# Patient Record
Sex: FEMALE | Race: WHITE | URBAN - METROPOLITAN AREA
[De-identification: names, ages, dates, MRNs, and addresses within clinical notes are randomized per-mention and may not be internally consistent; named-entity substitution may affect disease eponyms.]

---

## 2019-04-11 ENCOUNTER — HISTORICAL (OUTPATIENT)
Dept: ADMINISTRATIVE | Facility: HOSPITAL | Age: 49
End: 2019-04-11

## 2020-12-29 ENCOUNTER — HOSPITAL ENCOUNTER (OUTPATIENT)
Dept: MEDSURG UNIT | Facility: HOSPITAL | Age: 50
End: 2020-12-31
Attending: SURGERY | Admitting: SURGERY

## 2020-12-29 LAB
ABS NEUT (OLG): 18.31 X10(3)/MCL (ref 2.1–9.2)
ALBUMIN SERPL-MCNC: 4.6 GM/DL (ref 3.5–5)
ALBUMIN/GLOB SERPL: 1.5 RATIO (ref 1.1–2)
ALP SERPL-CCNC: 90 UNIT/L (ref 40–150)
ALT SERPL-CCNC: 19 UNIT/L (ref 0–55)
APPEARANCE, UA: CLEAR
AST SERPL-CCNC: 13 UNIT/L (ref 5–34)
BACTERIA SPEC CULT: NORMAL /HPF
BASOPHILS # BLD AUTO: 0.1 X10(3)/MCL (ref 0–0.2)
BASOPHILS NFR BLD AUTO: 0 %
BILIRUB SERPL-MCNC: 0.8 MG/DL
BILIRUB UR QL STRIP: NEGATIVE
BILIRUBIN DIRECT+TOT PNL SERPL-MCNC: 0.3 MG/DL (ref 0–0.5)
BILIRUBIN DIRECT+TOT PNL SERPL-MCNC: 0.5 MG/DL (ref 0–0.8)
BUN SERPL-MCNC: 7.1 MG/DL (ref 7–18.7)
CALCIUM SERPL-MCNC: 9.7 MG/DL (ref 8.4–10.2)
CHLORIDE SERPL-SCNC: 101 MMOL/L (ref 98–107)
CO2 SERPL-SCNC: 26 MMOL/L (ref 22–29)
COLOR UR: YELLOW
CREAT SERPL-MCNC: 0.73 MG/DL (ref 0.55–1.02)
EOSINOPHIL # BLD AUTO: 0 X10(3)/MCL (ref 0–0.9)
EOSINOPHIL NFR BLD AUTO: 0 %
ERYTHROCYTE [DISTWIDTH] IN BLOOD BY AUTOMATED COUNT: 12.8 % (ref 11.5–17)
GLOBULIN SER-MCNC: 3 GM/DL (ref 2.4–3.5)
GLUCOSE (UA): NEGATIVE
GLUCOSE SERPL-MCNC: 108 MG/DL (ref 74–100)
HCT VFR BLD AUTO: 45.6 % (ref 37–47)
HGB BLD-MCNC: 15 GM/DL (ref 12–16)
HGB UR QL STRIP: NEGATIVE
KETONES UR QL STRIP: NEGATIVE
LEUKOCYTE ESTERASE UR QL STRIP: NEGATIVE
LIPASE SERPL-CCNC: 19 U/L
LYMPHOCYTES # BLD AUTO: 1.6 X10(3)/MCL (ref 0.6–4.6)
LYMPHOCYTES NFR BLD AUTO: 8 %
MCH RBC QN AUTO: 28.4 PG (ref 27–31)
MCHC RBC AUTO-ENTMCNC: 32.9 GM/DL (ref 33–36)
MCV RBC AUTO: 86.2 FL (ref 80–94)
MONOCYTES # BLD AUTO: 1.2 X10(3)/MCL (ref 0.1–1.3)
MONOCYTES NFR BLD AUTO: 6 %
NEUTROPHILS # BLD AUTO: 18.31 X10(3)/MCL (ref 2.1–9.2)
NEUTROPHILS NFR BLD AUTO: 86 %
NITRITE UR QL STRIP: NEGATIVE
PH UR STRIP: 6.5 [PH] (ref 5–9)
PLATELET # BLD AUTO: 391 X10(3)/MCL (ref 130–400)
PMV BLD AUTO: 9.4 FL (ref 9.4–12.4)
POTASSIUM SERPL-SCNC: 4.5 MMOL/L (ref 3.5–5.1)
PROT SERPL-MCNC: 7.6 GM/DL (ref 6.4–8.3)
PROT UR QL STRIP: NEGATIVE
RBC # BLD AUTO: 5.29 X10(6)/MCL (ref 4.2–5.4)
RBC #/AREA URNS HPF: NORMAL /[HPF]
SODIUM SERPL-SCNC: 138 MMOL/L (ref 136–145)
SP GR UR STRIP: 1.01 (ref 1–1.03)
SQUAMOUS EPITHELIAL, UA: NORMAL
UROBILINOGEN UR STRIP-ACNC: 0.2
WBC # SPEC AUTO: 21.4 X10(3)/MCL (ref 4.5–11.5)
WBC #/AREA URNS HPF: NORMAL /[HPF]

## 2020-12-30 LAB
ABS NEUT (OLG): 11.88 X10(3)/MCL (ref 2.1–9.2)
ALBUMIN SERPL-MCNC: 3.4 GM/DL (ref 3.5–5)
ALBUMIN/GLOB SERPL: 1.3 RATIO (ref 1.1–2)
ALP SERPL-CCNC: 68 UNIT/L (ref 40–150)
ALT SERPL-CCNC: 13 UNIT/L (ref 0–55)
AST SERPL-CCNC: 10 UNIT/L (ref 5–34)
BASOPHILS # BLD AUTO: 0.1 X10(3)/MCL (ref 0–0.2)
BASOPHILS NFR BLD AUTO: 0 %
BILIRUB SERPL-MCNC: 0.9 MG/DL
BILIRUBIN DIRECT+TOT PNL SERPL-MCNC: 0.3 MG/DL (ref 0–0.5)
BILIRUBIN DIRECT+TOT PNL SERPL-MCNC: 0.6 MG/DL (ref 0–0.8)
BUN SERPL-MCNC: 8.8 MG/DL (ref 7–18.7)
CALCIUM SERPL-MCNC: 8 MG/DL (ref 8.4–10.2)
CHLORIDE SERPL-SCNC: 106 MMOL/L (ref 98–107)
CO2 SERPL-SCNC: 25 MMOL/L (ref 22–29)
CREAT SERPL-MCNC: 0.79 MG/DL (ref 0.55–1.02)
EOSINOPHIL # BLD AUTO: 0.1 X10(3)/MCL (ref 0–0.9)
EOSINOPHIL NFR BLD AUTO: 0 %
ERYTHROCYTE [DISTWIDTH] IN BLOOD BY AUTOMATED COUNT: 12.9 % (ref 11.5–17)
GLOBULIN SER-MCNC: 2.7 GM/DL (ref 2.4–3.5)
GLUCOSE SERPL-MCNC: 105 MG/DL (ref 74–100)
HCT VFR BLD AUTO: 38.6 % (ref 37–47)
HGB BLD-MCNC: 12.3 GM/DL (ref 12–16)
LYMPHOCYTES # BLD AUTO: 2.6 X10(3)/MCL (ref 0.6–4.6)
LYMPHOCYTES NFR BLD AUTO: 17 %
MCH RBC QN AUTO: 28.1 PG (ref 27–31)
MCHC RBC AUTO-ENTMCNC: 31.9 GM/DL (ref 33–36)
MCV RBC AUTO: 88.1 FL (ref 80–94)
MONOCYTES # BLD AUTO: 1 X10(3)/MCL (ref 0.1–1.3)
MONOCYTES NFR BLD AUTO: 7 %
NEUTROPHILS # BLD AUTO: 11.88 X10(3)/MCL (ref 2.1–9.2)
NEUTROPHILS NFR BLD AUTO: 75 %
PLATELET # BLD AUTO: 303 X10(3)/MCL (ref 130–400)
PMV BLD AUTO: 9.4 FL (ref 9.4–12.4)
POTASSIUM SERPL-SCNC: 3.8 MMOL/L (ref 3.5–5.1)
PROT SERPL-MCNC: 6.1 GM/DL (ref 6.4–8.3)
RBC # BLD AUTO: 4.38 X10(6)/MCL (ref 4.2–5.4)
SODIUM SERPL-SCNC: 138 MMOL/L (ref 136–145)
WBC # SPEC AUTO: 15.8 X10(3)/MCL (ref 4.5–11.5)

## 2020-12-31 LAB
ABS NEUT (OLG): 13.58 X10(3)/MCL (ref 2.1–9.2)
BASOPHILS # BLD AUTO: 0.1 X10(3)/MCL (ref 0–0.2)
BASOPHILS NFR BLD AUTO: 0 %
BUN SERPL-MCNC: 7.7 MG/DL (ref 7–18.7)
CALCIUM SERPL-MCNC: 7.9 MG/DL (ref 8.4–10.2)
CHLORIDE SERPL-SCNC: 108 MMOL/L (ref 98–107)
CO2 SERPL-SCNC: 23 MMOL/L (ref 22–29)
CREAT SERPL-MCNC: 0.69 MG/DL (ref 0.55–1.02)
CREAT/UREA NIT SERPL: 11
EOSINOPHIL # BLD AUTO: 0 X10(3)/MCL (ref 0–0.9)
EOSINOPHIL NFR BLD AUTO: 0 %
ERYTHROCYTE [DISTWIDTH] IN BLOOD BY AUTOMATED COUNT: 12.9 % (ref 11.5–17)
GLUCOSE SERPL-MCNC: 105 MG/DL (ref 74–100)
HCT VFR BLD AUTO: 35.3 % (ref 37–47)
HGB BLD-MCNC: 11 GM/DL (ref 12–16)
LYMPHOCYTES # BLD AUTO: 1.9 X10(3)/MCL (ref 0.6–4.6)
LYMPHOCYTES NFR BLD AUTO: 12 %
MCH RBC QN AUTO: 27.8 PG (ref 27–31)
MCHC RBC AUTO-ENTMCNC: 31.2 GM/DL (ref 33–36)
MCV RBC AUTO: 89.4 FL (ref 80–94)
MONOCYTES # BLD AUTO: 1 X10(3)/MCL (ref 0.1–1.3)
MONOCYTES NFR BLD AUTO: 6 %
NEUTROPHILS # BLD AUTO: 13.58 X10(3)/MCL (ref 2.1–9.2)
NEUTROPHILS NFR BLD AUTO: 82 %
PLATELET # BLD AUTO: 283 X10(3)/MCL (ref 130–400)
PMV BLD AUTO: 9.4 FL (ref 9.4–12.4)
POTASSIUM SERPL-SCNC: 4.2 MMOL/L (ref 3.5–5.1)
RBC # BLD AUTO: 3.95 X10(6)/MCL (ref 4.2–5.4)
SODIUM SERPL-SCNC: 140 MMOL/L (ref 136–145)
WBC # SPEC AUTO: 16.6 X10(3)/MCL (ref 4.5–11.5)

## 2021-01-03 LAB
FINAL CULTURE: NORMAL
FINAL CULTURE: NORMAL

## 2022-04-30 NOTE — H&P
Patient:   Marilia Hanley            MRN: [id number]            FIN: [id number]               Age:   50 years     Sex:  Female     :  1970   Associated Diagnoses:   None   Author:   Rin Boone MD      Results Review   General results   Today's results : ALLRESLTSECT   2020 3:58 CST      Sodium Lvl                138 mmol/L                             Potassium Lvl             3.8 mmol/L                             Chloride                  106 mmol/L                             CO2                       25 mmol/L                             Calcium Lvl               8.0 mg/dL  LOW                             Glucose Lvl               105 mg/dL  HI                             BUN                       8.8 mg/dL                             Creatinine                0.79 mg/dL                             eGFR-AA                   >60  NA                             eGFR-MOHINDER                  >60 mL/min/1.73 m2  NA                             Bili Total                0.9 mg/dL                             Bili Direct               0.3 mg/dL                             Bili Indirect             0.60 mg/dL                             AST                       10 unit/L                             ALT                       13 unit/L                             Alk Phos                  68 unit/L                             Total Protein             6.1 gm/dL  LOW                             Albumin Lvl               3.4 gm/dL  LOW                             Globulin                  2.7 gm/dL                             A/G Ratio                 1.3 ratio                             WBC                       15.8 x10(3)/mcL  HI                             RBC                       4.38 x10(6)/mcL                             Hgb                       12.3 gm/dL                             Hct                       38.6 %                             Platelet                  303  x10(3)/mcL                             MCV                       88.1 fL                             MCH                       28.1 pg                             MCHC                      31.9 gm/dL  LOW                             RDW                       12.9 %                             MPV                       9.4 fL                             Abs Neut                  11.88 x10(3)/mcL  HI                             Neutro Auto               75 %  NA                             Lymph Auto                17 %  NA                             Mono Auto                 7 %  NA                             Eos Auto                  0 %  NA                             Abs Eos                   0.1 x10(3)/mcL                             Basophil Auto             0 %  NA                             Abs Neutro                11.88 x10(3)/mcL  HI                             Abs Lymph                 2.6 x10(3)/mcL                             Abs Mono                  1.0 x10(3)/mcL                             Abs Baso                  0.1 x10(3)/mcL    12/29/2020 13:33 CST     Sodium Lvl                138 mmol/L                             Potassium Lvl             4.5 mmol/L                             Chloride                  101 mmol/L                             CO2                       26 mmol/L                             Calcium Lvl               9.7 mg/dL                             Glucose Lvl               108 mg/dL  HI                             BUN                       7.1 mg/dL                             Creatinine                0.73 mg/dL                             eGFR-AA                   >60  NA                             eGFR-MOHINDER                  >60 mL/min/1.73 m2  NA                             Bili Total                0.8 mg/dL                             Bili Direct               0.3 mg/dL                             Bili Indirect             0.50 mg/dL                             AST                        13 unit/L                             ALT                       19 unit/L                             Alk Phos                  90 unit/L                             Total Protein             7.6 gm/dL                             Albumin Lvl               4.6 gm/dL                             Globulin                  3.0 gm/dL                             A/G Ratio                 1.5 ratio                             Lipase Lvl                19 U/L                             WBC                       21.4 x10(3)/mcL  HI                             RBC                       5.29 x10(6)/mcL                             Hgb                       15.0 gm/dL                             Hct                       45.6 %                             Platelet                  391 x10(3)/mcL                             MCV                       86.2 fL                             MCH                       28.4 pg                             MCHC                      32.9 gm/dL  LOW                             RDW                       12.8 %                             MPV                       9.4 fL                             Abs Neut                  18.31 x10(3)/mcL  HI                             Neutro Auto               86 %  NA                             Lymph Auto                8 %  NA                             Mono Auto                 6 %  NA                             Eos Auto                  0 %  NA                             Abs Eos                   0.0 x10(3)/mcL                             Basophil Auto             0 %  NA                             Abs Neutro                18.31 x10(3)/mcL  HI                             Abs Lymph                 1.6 x10(3)/mcL                             Abs Mono                  1.2 x10(3)/mcL                             Abs Baso                  0.1 x10(3)/mcL        History of Present Illness   50F with abdominal pain and cramping since  Monday night; h/o umbilical hernia s/p repair in April, 2019 (Dr. Sharif).  She says that she started to have cramping abdominal pain on Monday night.   She notes the pain was more periumbilical and central pelvis/lower abdomen.  She had nausea and vomiting once associated with this. She went to sleep and woke up Tuesday morning with progressively worsened pain and cramping.  + Flatus and BM.     She came to the ER for evaluation after CT a/p obtained per PCP suggested acute appendicitis.   This has never happened before.  No sick contacts.  No recent illnesses.     MedHx: denies  SurgHx: tonsillectomy as child, umbilical hernia repair 2019, colonoscopy x 2, BTL, cone biopsy  FamHx: colon cancer in father and his parents, htn  NKDA  Meds: as listed  SocHx: lives at home with  and 16 y/o son.  Has three children from prior relationship.  No alcohol, tobacco, illicit drugs.  Works as  at [employer] in [place].      Physical Examination   vs ok, Tmax 99.6 ~ 11 pm  a+ox3, nad  eomi  neck supple  no supraclavicular lymphadenopathy  chest rise equal B  heart rrr  abd soft, + ttp lower mid pelvis/abdomen, mild ttp rlq and mild ttp llq, nontender upper abdomen  ble no ttp  skin no apparent rashes  psych calm, cooperative  neuro no lateralizing defects appreciated grossly     CT a/p images and report personally reviewed -    IMPRESSION:   Findings consistent with acute appendicitis. No drainable fluid  collection or free air.      Impression and Plan   50F with acute appendicitis - plan for:  1) Admit  2) hydrate  3) iv abx  4) to OR for laparoscopic appendectomy - r/b/a all discussed, and she would like to proceed.      I appreciate the opportunity to be involved in Mrs. Hanley's care.

## 2022-04-30 NOTE — OP NOTE
DATE OF SURGERY:    04/12/2019    SURGEON:  Flip Sharif MD    PREPROCEDURE DIAGNOSIS:  Incarcerated umbilical hernia.    POSTPROCEDURE DIAGNOSIS:  Incarcerated umbilical hernia.    PROCEDURE PERFORMED:  Umbilical hernia repair with mesh.    ANESTHESIA:  General endotracheal.    DESCRIPTION OF PROCEDURE:  The patient was transferred to the operating room, placed on operating room table in supine position.  General anesthetic was administered per Anesthesia.  The patient tolerated this well.  Abdomen was clipped with sterile clippers and prepped with chlorhexidine solution and draped with sterile drapes and cloth.  Time-out was taken to verify correct patient and procedure.  Preoperative antibiotic was given.     After a time-out was taken, a skin marker was used to make a curvilinear transverse subumbilical incision mingo.  It was infiltrated with local anesthetic.  It was taken down through the skin and subcutaneous tissue with a 15 blade, taken down through the subcutaneous tissue using electrocautery until the anterior rectus sheath was encountered.  The hernia was completely encircled with blunt dissection.  The overlying umbilical dermis was  from the underlying umbilical hernia sac with sharp electrocautery dissection, taking care to protect the dermis.  Once it was completely removed from the overlying umbilical dermis, self-retaining retractors were placed.  The fascia was cleaned from its edges approximately a centimeter around circumferentially.  The hernia sac was completely removed with electrocautery.  The hernia was noted to contain preperitoneal fat only and all appeared healthy.  Hernia sac was sent as a specimen.  Preperitoneal fat was allowed to reduce back through the hernia defect.  The hernia defect appeared to be the size of my 5th fingertip.  The preperitoneal space was then further developed with blunt electrocautery dissection large enough to fit the 4.3 cm round Ventralex  ST mesh.  Once the preperitoneal space was developed, the 4.3 cm round Ventralex ST mesh was rolled into a cylinder, placed into the preperitoneal space, and allowed to unravel and lay completely flat throughout the preperitoneal space.  Once the mesh was ensured to be completely flat in all directions with good underlay, the fascia was closed in transverse fashion over the mesh, taking a small piece of mesh tails with each throw of 0 Prolene interrupted sutures.  Excess mesh tails were completely removed and the fascia was closed completely over the mesh without difficulty.  At this point, the wound was copiously irrigated with saline solution.  Local anesthetic was injected into the anterior fascia and subcutaneous space under direct vision.  Hemostasis was assured.  The umbilical stalk was reapproximated to the anterior fascia with an interrupted inverted 3-0 Vicryl suture.  Appeared to be adequate.  At that point, the deep dermal space was closed with interrupted inverted 3-0 Vicryl sutures.  The skin was closed with running 4-0 Monocryl subcuticular stitch and Dermabond was placed to the skin for dressing.  Overlying 4 x 4 and Tegaderm light pressure dressing was placed over the umbilicus.  The patient tolerated the procedure well.  Needle and sponge counts were correct.    COMPLICATIONS:  None.    SPECIMENS:  Hernia sac.    ESTIMATED BLOOD LOSS:  Less than 10 cc.        ______________________________  MD NATASHA Dan/CARLOS  DD:  04/12/2019  Time:  04:24PM  DT:  04/12/2019  Time:  04:45PM  Job #:  [id number]

## 2022-04-30 NOTE — ED PROVIDER NOTES
"   Patient:   Marilia Hanley            MRN: [id number]            FIN: [id number]               Age:   50 years     Sex:  Female     :  1970   Associated Diagnoses:   Abdominal pain; Acute appendicitis   Author:   Will MIDDLETON, Alex BREWSTER      Report Summary       General:       Alert, no acute distress.       Basic Information   Time seen: Date & time 2020 13:26:00.   History source: Patient, family.   Arrival mode: Private vehicle, walking.   History limitation: None.   Additional information: Patient's physician(s): Tabitha MIDDLETON, Lizzie Gibbs.      History of Present Illness   The patient presents with abdominal pain, Patient states lower abdominal pain, nausea, and vomiting starting last night (lorna, NP).  and I, Dr. Solis, assumed care of this patient upon walking into the room at 1642.    51 y/o CF with a history of umbilical hernia, presents to the ED complaining of lower abdominal pain since 2100 yesterday. The pt reports that the abdominal pain started gradually, and became severe at 2300. She describes the pain as "stabbing" and "burning." Nausea and vomiting x1 are associated symptoms. Denies constipation, diarrhea, fever, dysuria, or hematuria..  The onset was 2020 21:00:00  and gradual.  The course/duration of symptoms is improving.  The character of symptoms is "stabbing" and "burning".  The degree at onset was moderate.  The Location of pain at onset was lower and abdominal.  The degree at present is moderate.  The Location of pain at present is lower and abdominal.  Radiating pain: none. The exacerbating factor is none.  The relieving factor is none.  Therapy today: none.  Risk factors consist of none.  Associated symptoms: nausea, vomiting, denies diarrhea and denies fever.        Review of Systems   Constitutional symptoms:  No fever, no chills, no sweats   Skin symptoms:  No rash, no petechiae.    Eye symptoms:  Vision unchangedNo pain, no discharge, no icterus, no " diplopia, no blurred vision, no blindness.    ENMT symptoms:  Negative except as documented in HPI, no ear pain, no sore throat, no nasal congestion, no sinus pain.    Respiratory symptoms:  Negative except as documented in HPI, no shortness of breath, no orthopnea, no cough, no hemoptysis, no stridor, no wheezing.    Cardiovascular symptoms:  Negative except as documented in HPI, no chest pain, no palpitations, no syncope, no diaphoresis, no peripheral edema.    Gastrointestinal symptoms:  Abdominal pain, right lower quadrant, left lower quadrant, periumbilical, suprapubic, sharp, burning, nausea, vomiting, no diarrhea, no constipation, no rectal bleeding, no rectal pain.    Genitourinary symptoms:  No dysuria, no hematuria.    Musculoskeletal symptoms:  No back pain, no Muscle pain   Neurologic symptoms:  No headache, no dizziness, no altered level of consciousness, no numbness, no tingling   Psychiatric symptoms:  Negative except as documented in HPI   Endocrine symptoms:  Negative except as documented in HPI.   Hematologic/Lymphatic symptoms:  Negative except as documented in HPI      Health Status   Allergies: No known allergies.   Medications:  (Selected)   Documented Medications  Documented  ergocalciferol 50,000 intl units (1.25 mg) oral capsule: 79019 IntUnit = 1 cap(s), Oral, qWeek  lisinopril 10 mg oral tablet: 10 mg = 1 tab(s), Oral, Daily  sertraline 25 mg oral tablet: 25 mg = 1 tab(s), Oral, Daily.      Past Medical/ Family/ Social History   Medical history: Negative.   Surgical history:    Hernia Repair Umbilical (.) on 4/12/2019 at 48 Years.  Comments:  4/12/2019 8:32 LYNSEY Sanon RN, Fco ENGLISH  auto-populated from documented surgical case  tonsillectomy.  laproscopy.  bilateral tubal ligation.  cervical cone biopsy.  breast augmentation.  colonscopy..   Family history: Not significant.   Social history: Alcohol use: Regularly, Tobacco use: Regularly.      Physical Examination               Vital  Signs   Vital Signs   12/29/2020 16:11 CST     Peripheral Pulse Rate     84 bpm                             Respiratory Rate          18 br/min                             SpO2                      96 %                             Oxygen Therapy            Room air                             Systolic Blood Pressure   114 mmHg                             Diastolic Blood Pressure  72 mmHg                             Mean Arterial Pressure, Cuff              86 mmHg    12/29/2020 16:09 CST     Oxygen Therapy            Room air    12/29/2020 13:26 CST     Temperature Oral          36.7 DegC                             Temperature Oral (calculated)             98.06 DegF                             Peripheral Pulse Rate     78 bpm                             Respiratory Rate          20 br/min                             SpO2                      97 %                             Oxygen Therapy            Room air                             Systolic Blood Pressure   159 mmHg  HI                             Diastolic Blood Pressure  92 mmHg  HI  .   Measurements   12/29/2020 13:26 CST     Weight Dosing             72.5 kg                             Weight Measured and Calculated in Lbs     159.83 lb                             Weight Estimated          72.5 kg                             Height/Length Dosing      167 cm                             Height/Length Estimated   167 cm                             Body Mass Index Estimated 26 kg/m2  .   Basic Oxygen Information   12/29/2020 16:11 CST     SpO2                      96 %                             Oxygen Therapy            Room air    12/29/2020 16:09 CST     Oxygen Therapy            Room air    12/29/2020 13:26 CST     SpO2                      97 %                             Oxygen Therapy            Room air  .   General:  Alert, no acute distress   Neurological:  Alert and oriented to person, place, time, and situation, No focal neurological deficit  observed, CN II-XII intact, normal sensory observed, normal motor observed, normal speech observed   Skin:  Warm, dry, no rash   Head:  Normocephalic, atraumatic   Neck:  Supple, trachea midline, no JVD   Eye:  Pupils are equal, round and reactive to light, extraocular movements are intact, normal conjunctiva, vision unchanged   Ears, nose, mouth and throat:  Tympanic membranes clear, oral mucosa moist, no pharyngeal erythema or exudate.    Cardiovascular:  Regular rate and rhythm, No murmur, No edema   Respiratory:  Lungs are clear to auscultation, respirations are non-labored, breath sounds are equal, Symmetrical chest wall expansion.    Chest wall:  No tenderness   Back:  Normal range of motion   Musculoskeletal:  Normal ROM, normal strength, no tenderness   Gastrointestinal:  Soft, Non distended, Normal bowel sounds, No organomegaly, Tenderness: Periumbilical, bilateral lower quadrants, Guarding: Negative, Rebound: Negative, Signs: Rovsing's.    Psychiatric:  Cooperative, appropriate mood & affect, normal judgment            Medical Decision Making   Differential Diagnosis:  Abdominal pain, Appendicitis, bowel obstruction, incarcerated hernia.    Rationale:  Patient with a prior history of periumbilical hernia status post surgery here for periumbilical pain is about 9 PM last night and improved overnight went to sleep woke up this morning worse with a 2 episodes of vomiting.  Denies any fevers.  Vital signs stable here, afebrile.  Some with lower quadrant and suprapubic abdominal TTP with no rebound or guarding. NL BS.  Labs with significant like leukocytosis at 21,000, otherwise unremarkable.  Given Toradol, IV fluids and Zofran with significant provement of pain.  CT abdomen pelvis with contrast shows acute appendicitis.  Surgery called at 1705 and will evaluate patient in the ER for acute surgical therapy. Given dose if Zosyn in ED..   Documents reviewed:  Emergency department nurses' notes.   Orders  Launch  Order Profile (Selected)   Inpatient Orders  Ordered  Sodium Chloride 0.9% 1000mL 1,000 mL: 1,000 mL, 1,000 mL, IV, 999 mL/hr, order duration: 1 dose(s), start date 12/29/20 16:51:00 CST, stop date 12/29/20 17:50:00 CST, 1.81, m2  Ordered (Collected)  Blood Culture: 12/29/20 16:57:59 CST, STAT collect, BLOOD, Collected, Stop date 12/29/20 16:57:00 CST  Blood Culture: 12/29/20 16:57:59 CST, STAT collect, BLOOD, Collected, Stop date 12/29/20 16:57:00 CST  Completed  Automated Diff: NOW collect, 12/29/20 13:33:00 CST, Blood, Collected, Stop date 12/29/20 13:33:00 CST, Lab Collect, Print Label By Order Location, 12/29/20 13:27:00 CST  CBC w/ Auto Diff: NOW collect, 12/29/20 13:33:57 CST, BLOOD, Collected, Stop date 12/29/20 13:33:00 CST, Lab Collect  CMP: STAT collect, 12/29/20 13:33:57 CST, BLOOD, Collected, Stop date 12/29/20 13:33:00 CST, Lab Collect  CT Abdomen and Pelvis W Contrast: Stat, 12/29/20 15:31:00 CST, Abdominal Pain, None, Stretcher, Creatinine if needed per protocol, Rad Type, 12/29/20 15:31:00 CST  Estimated Glomerular Filtration Rate: STAT collect, 12/29/20 13:33:00 CST, Blood, Collected, Stop date 12/29/20 13:33:00 CST, Lab Collect, Print Label By Order Location, 12/29/20 13:27:00 CST  Lipase Level: STAT collect, 12/29/20 13:33:57 CST, BLOOD, Collected, Stop date 12/29/20 13:33:00 CST, Lab Collect  POC Urine Pregnancy Test ED: Urine, Stat collect, Collected, 12/29/20 13:27:00 CST by Frandy KINGSTON, Celia GUILLERMO, Stop date 12/29/20 13:27:00 CST, Nurse collect, Print Label By Order Location  Sodium Chloride 0.9% intravenous solution: 100 mL, Soln, N/A, Once, Stop date 12/29/20 16:46:14 CST, Physician Stop, 12/29/20 16:46:14 CST  Toradol: 30 mg, form: Injection, IV Push, Once, first dose 12/29/20 13:27:00 CST, stop date 12/29/20 13:27:00 CST, STAT  UA with Reflex: Stat collect, Urine, 12/29/20 13:27:00 CST, Stop date 12/29/20 13:28:00 CST, Nurse collect, Print Label By Order Location  Zofran 2 mg/mL injectable  solution: 4 mg, form: Injection, IV Push, Once, first dose 12/29/20 13:27:00 CST, stop date 12/29/20 13:27:00 CST, STAT  Zosyn 3.375 gm (for IVPB): 3.375 gm, form: Injection, IV Piggyback, Once, Infuse over: 0.5 hr, first dose 12/29/20 16:41:00 CST, stop date 12/29/20 16:41:00 CST, STAT  iopamidol: form: Soln, IV, AdHoc, first dose 12/29/20 16:21:38 CST, stop date 12/29/20 16:21:38 CST  piperacillin-tazobactam: 3.375 gm, 1 EA, Injection, N/A, Once, Stop date 12/29/20 16:46:08 CST, Physician Stop, 12/29/20 16:46:08 CST.   Results review:  Lab results : Lab View   12/29/2020 14:48 CST     Est Creat Clearance Ser   87.37 mL/min    12/29/2020 13:46 CST     U beta hCG Ql POC         Negative    12/29/2020 13:33 CST     Sodium Lvl                138 mmol/L                             Potassium Lvl             4.5 mmol/L                             Chloride                  101 mmol/L                             CO2                       26 mmol/L                             Calcium Lvl               9.7 mg/dL                             Glucose Lvl               108 mg/dL  HI                             BUN                       7.1 mg/dL                             Creatinine                0.73 mg/dL                             eGFR-AA                   >60  NA                             eGFR-MOHINDER                  >60 mL/min/1.73 m2  NA                             Bili Total                0.8 mg/dL                             Bili Direct               0.3 mg/dL                             Bili Indirect             0.50 mg/dL                             AST                       13 unit/L                             ALT                       19 unit/L                             Alk Phos                  90 unit/L                             Total Protein             7.6 gm/dL                             Albumin Lvl               4.6 gm/dL                             Globulin                  3.0 gm/dL                              A/G Ratio                 1.5 ratio                             Lipase Lvl                19 U/L                             WBC                       21.4 x10(3)/mcL  HI                             RBC                       5.29 x10(6)/mcL                             Hgb                       15.0 gm/dL                             Hct                       45.6 %                             Platelet                  391 x10(3)/mcL                             MCV                       86.2 fL                             MCH                       28.4 pg                             MCHC                      32.9 gm/dL  LOW                             RDW                       12.8 %                             MPV                       9.4 fL                             Abs Neut                  18.31 x10(3)/mcL  HI                             Neutro Auto               86 %  NA                             Lymph Auto                8 %  NA                             Mono Auto                 6 %  NA                             Eos Auto                  0 %  NA                             Abs Eos                   0.0 x10(3)/mcL                             Basophil Auto             0 %  NA                             Abs Neutro                18.31 x10(3)/mcL  HI                             Abs Lymph                 1.6 x10(3)/mcL                             Abs Mono                  1.2 x10(3)/mcL                             Abs Baso                  0.1 x10(3)/mcL    12/29/2020 13:08 CST     UA Appear                 CLEAR                             UA Color                  YELLOW                             UA Spec Grav              1.009                             UA Bili                   Negative                             UA pH                     6.5                             UA Urobilinogen           0.2                             UA Blood                  Negative                              UA Glucose                Negative                             UA Ketones                Negative                             UA Protein                Negative                             UA Nitrite                Negative                             UA Leuk Est               Negative                             UA WBC                    NONE SEEN                             UA RBC                    NONE SEEN                             UA Bacteria               NONE SEEN /HPF                             UA Squam Epithelial       NONE SEEN  .   Radiology results:  Rad Results (ST)  < 12 hrs   Accession: [id number]  Order: CT Abdomen and Pelvis W Contrast  Report Dt/Tm: 12/29/2020 16:25  Report:   CT Abdomen and Pelvis W Contrast     REASON FOR STUDY: Abdominal Pain      TECHNIQUE: CT imaging was performed of the abdomen and pelvis after  the administration of intravenous contrast. Dose length product is 640  mGycm. Automatic exposure control, adjustment of mA/kV or iterative  reconstruction technique was used to limit radiation dose.     COMPARISON: None      FINDINGS:      Liver: Normal.      Gallbladder and biliary tree: No calcified gallstones. No intra or  extrahepatic biliary ductal dilation.      Pancreas: Normal.     Spleen: Normal.     Adrenals: Normal.     Kidneys and ureters: Normal.     Bladder: Decompressed.     Reproductive organs: No pelvic masses.     Stomach/bowel: The appendix is dilated measuring up to 11 mm in  thickness with surrounding inflammatory changes and a 4 mm  appendicolith at the appendix base. There is no bowel obstruction.     Lymph nodes: Small scattered mesenteric lymph nodes which may be  reactive.     Peritoneum: Mild stranding in the central mesentery may be  inflammatory. No organized fluid collection or free air.     Vessels: No abdominal aortic aneurysm.      Abdominal wall: Normal.     Lung bases: Minimal left basilar subsegmental atelectasis.     Bones: No acute osseous  findings.      IMPRESSION:   Findings consistent with acute appendicitis. No drainable fluid  collection or free air.    .      Impression and Plan   Diagnosis   Abdominal pain (PNED [id number])   Acute appendicitis ([id number]-ZE K35.80)      Calls-Consults   -  12/29/2020 17:05:00 , surgical hospitalist resident Dr Petit.    Plan   Condition: Improved, Stable.    Disposition: Admit.    Counseled: Patient, Family, Regarding diagnosis, Regarding diagnostic results, Regarding treatment plan, Patient indicated understanding of instructions, Family verbalizes understanding .    Notes: IHenrietta, acted solely as a scribe for and in the presence of Dr. Solis who performed the service., IAlex M.D., personally performed the history, physical exam and medical decision making; and confirmed the accuracy of the information in the transcribed note.

## 2022-04-30 NOTE — OP NOTE
DATE OF SURGERY:    12/30/2020    SURGEON:  Flip Sharif MD    PREPROCEDURE DIAGNOSIS:  Acute appendicitis.    POSTPROCEDURE DIAGNOSIS:  Acute appendicitis.    PROCEDURE PERFORMED:  Laparoscopic appendectomy/cecectomy.    ANESTHESIA:  General.    DESCRIPTION OF PROCEDURE:  Patient was transferred to the operating room, placed on the operating table in supine position.  General anesthetic was administered per anesthesia.  Patient tolerated this well.  Daugherty catheter was placed.  Abdomen was prepped with chlorhexidine solution, draped with sterile drapes and cloths.  Timeout was taken to verify correct patient and procedure.  Preoperative antibiotic was given.     After time out was taken, Gelpi clamp was placed in the umbilicus, the umbilicus was raised, and the supraumbilical space was infiltrated with 0.5% Marcaine with epinephrine.  A 5 mm transverse incision was made with an 11 blade.  Veress needle was inserted into the abdomen.  The abdomen was insufflated with 15 mmHg CO2, and patient tolerated this well.  The Veress needle was removed.  A 5 mm Visiport was placed under direct vision into the abdominal cavity.  No Veress needle injury was noted.  She was noted to have omental attachments to her previous umbilical hernia repair, but no signs of recurrence were noted, and at that point a 5 mm suprapubic port, 11 mm left lower quadrant port were placed under direct vision after infiltration of local anesthetic.  The patient was positioned with the head down, left side down, to expose the right lower quadrant.  The base of the cecum was traced from the tenia to the base of the appendix, which was noted to lay in the right pelvis going toward the suprapubic space.  The appendix was noted to be acutely injected and thick-walled.  The terminal ileum and Markus's veil were draped over the appendix.  These were bluntly dissected free until the appendix could be grasped in its body and brought into the right  lower quadrant.  The ileum was protected throughout the dissection.  A Harmonic Scalpel was used to take the mesoappendix from the tip of the appendix to the base of the appendix.  The base of the appendix was noted to be indurated and involved in the acute inflammatory process.  The cecum was noted to be soft.  The Harmonic Scalpel was used to take the investing tissue and mesentery from the cecum toward the ileocecal valve.  The ileocecal valve was noted to be widely patent, and once the mesentery was completely taken to the ileocecal valve, a green load Endo-VIKRAM stapler was placed across the cecum and fired.  The 2nd fire was taken with a blue load Endo-VIKRAM stapler to completely transect the cecum with the appendix.  The ileocecal valve was protected throughout the stapling process.  It appeared to be widely patent after this was completed.  The mesoappendix was noted to be hemostatic.  The staple lines also appeared to be hemostatic.  At that point, the appendix was placed into a 10 mm endoscopic retrieval bag and removed through the left lower quadrant port without difficulty or dilation.  The staple line was again viewed, appeared to be hemostatic and intact.  The mesoappendix was also noted to be hemostatic.  The right lower quadrant was minimally irrigated with saline solution.  All irrigant returned clear.       Attention was turned to the pelvis.  A minimal amount of reactive fluid was noted in the pelvis, and this was suctioned out and minimally irrigated with saline solution.  There was no apparent perforation of the appendix noted.  At that point, the patient was flattened out.  All remaining irrigant was suctioned out.  Ports were removed under direct vision.  No bleeding was noted.  Fascia was closed with 0 Vicryl suture on a laparoscopic suture passer.  It appeared to have good seal.  The abdomen was allowed to desufflate, and the supraumbilical port was removed.  Subcutaneous spaces were irrigated  with saline solution.  Skin incisions were closed with simple interrupted inverted 4-0 Monocryl sutures.  Exofin dressing was placed.  Daugherty catheter was removed.  Patient tolerated procedure well.  Needle and sponge counts were correct.    COMPLICATIONS:  None.    SPECIMENS:  Appendix/cecum.    ESTIMATED BLOOD LOSS:  Less than 20 cc.        ______________________________  MD NATASHA Dan/EMELY  DD:  12/31/2020  Time:  02:50PM  DT:  12/31/2020  Time:  03:09PM  Job #:  [id number]